# Patient Record
Sex: MALE | Race: WHITE | NOT HISPANIC OR LATINO | URBAN - METROPOLITAN AREA
[De-identification: names, ages, dates, MRNs, and addresses within clinical notes are randomized per-mention and may not be internally consistent; named-entity substitution may affect disease eponyms.]

---

## 2019-02-22 ENCOUNTER — EMERGENCY (EMERGENCY)
Facility: HOSPITAL | Age: 44
LOS: 1 days | Discharge: HOME | End: 2019-02-22
Attending: EMERGENCY MEDICINE | Admitting: EMERGENCY MEDICINE
Payer: COMMERCIAL

## 2019-02-22 VITALS
OXYGEN SATURATION: 100 % | DIASTOLIC BLOOD PRESSURE: 94 MMHG | RESPIRATION RATE: 18 BRPM | SYSTOLIC BLOOD PRESSURE: 145 MMHG | HEART RATE: 85 BPM | TEMPERATURE: 98 F

## 2019-02-22 DIAGNOSIS — R41.82 ALTERED MENTAL STATUS, UNSPECIFIED: ICD-10-CM

## 2019-02-22 DIAGNOSIS — F10.129 ALCOHOL ABUSE WITH INTOXICATION, UNSPECIFIED: ICD-10-CM

## 2019-02-22 PROCEDURE — 99284 EMERGENCY DEPT VISIT MOD MDM: CPT | Mod: 25

## 2019-02-22 RX ORDER — ONDANSETRON 8 MG/1
8 TABLET, FILM COATED ORAL ONCE
Qty: 0 | Refills: 0 | Status: COMPLETED | OUTPATIENT
Start: 2019-02-22 | End: 2019-02-22

## 2019-02-22 RX ADMIN — ONDANSETRON 8 MILLIGRAM(S): 8 TABLET, FILM COATED ORAL at 22:24

## 2019-02-22 NOTE — ED ADULT NURSE NOTE - CHIEF COMPLAINT QUOTE
patient here due to alcohol intoxication. admits to drinking. vomited with EMS. patient found at [facility].

## 2019-02-22 NOTE — ED ADULT NURSE NOTE - OBJECTIVE STATEMENT
44 yo M presents to ED with AMS. Pt noted to have vomit on clothing at this time and states "I drank too much scotch". Pt is sleepy at this time and easily arousable. pt has no sign of trauma noted to head or body at this time. Pt denies fall. Pt breathing with no s.s of acute distress. Pt safety maintained and will continue to monitor.

## 2019-02-22 NOTE — ED ADULT NURSE NOTE - NSIMPLEMENTINTERV_GEN_ALL_ED
Implemented All Fall Risk Interventions:  [place] to call system. Call bell, personal items and telephone within reach. Instruct patient to call for assistance. Room bathroom lighting operational. Non-slip footwear when patient is off stretcher. Physically safe environment: no spills, clutter or unnecessary equipment. Stretcher in lowest position, wheels locked, appropriate side rails in place. Provide visual cue, wrist band, yellow gown, etc. Monitor gait and stability. Monitor for mental status changes and reorient to person, place, and time. Review medications for side effects contributing to fall risk. Reinforce activity limits and safety measures with patient and family.

## 2019-02-23 VITALS
HEART RATE: 94 BPM | DIASTOLIC BLOOD PRESSURE: 81 MMHG | TEMPERATURE: 98 F | SYSTOLIC BLOOD PRESSURE: 131 MMHG | RESPIRATION RATE: 18 BRPM | OXYGEN SATURATION: 94 %

## 2019-02-23 NOTE — ED PROVIDER NOTE - NEUROLOGICAL, MLM
Alert and oriented, no focal deficits, no motor or sensory deficits. Normal strength in extremities. Follows commands.

## 2019-02-23 NOTE — ED PROVIDER NOTE - OBJECTIVE STATEMENT
42 y/o male with no pertinent PMHx BIB EMS presents to the ED AMS/ETOH intoxication. Pt states he was drinking scotch today and exceeded his normal level today. He states he does not remember what happened. According to triage note, Pt was found at [facility] and vomited x 1 in EMS. He lives in [place] and plans to take the bus home. Denies head trauma. No sign of trauma. No abrasion, no deformities, no weakness. 44 y/o male with no pertinent PMHx BIB EMS presents to the ED AMS/ETOH intoxication. Pt states he was drinking scotch today and exceeded his normal limit. He states he does not remember what happened. According to triage note, Pt was found at [facility] and vomited x 1 in EMS. He lives in [place] and plans to take the bus home. Denies head trauma. No signs of trauma. No abrasion, no deformities, no weakness.

## 2019-02-23 NOTE — ED PROVIDER NOTE - CONSTITUTIONAL, MLM
normal... Well appearing, well nourished, awake, alert, oriented to person, place, time/situation and in no apparent distress. Alert. Mildly somnolent. Vomit all over clothes, but responds to questions.

## 2019-02-23 NOTE — ED PROVIDER NOTE - CLINICAL SUMMARY MEDICAL DECISION MAKING FREE TEXT BOX
Will observe for sobriety, and discharge when clinically sober. Zofran provided for nausea/vomiting.
